# Patient Record
Sex: MALE | Race: OTHER | HISPANIC OR LATINO | URBAN - METROPOLITAN AREA
[De-identification: names, ages, dates, MRNs, and addresses within clinical notes are randomized per-mention and may not be internally consistent; named-entity substitution may affect disease eponyms.]

---

## 2018-08-06 ENCOUNTER — EMERGENCY (EMERGENCY)
Facility: HOSPITAL | Age: 38
LOS: 1 days | Discharge: HOME | End: 2018-08-06
Attending: EMERGENCY MEDICINE
Payer: COMMERCIAL

## 2018-08-06 VITALS
HEART RATE: 77 BPM | SYSTOLIC BLOOD PRESSURE: 145 MMHG | TEMPERATURE: 98 F | RESPIRATION RATE: 18 BRPM | OXYGEN SATURATION: 97 % | DIASTOLIC BLOOD PRESSURE: 97 MMHG

## 2018-08-06 PROCEDURE — 90715 TDAP VACCINE 7 YRS/> IM: CPT

## 2018-08-06 PROCEDURE — 99283 EMERGENCY DEPT VISIT LOW MDM: CPT | Mod: 25

## 2018-08-06 PROCEDURE — 90471 IMMUNIZATION ADMIN: CPT

## 2018-08-06 PROCEDURE — 73140 X-RAY EXAM OF FINGER(S): CPT | Mod: 26,LT

## 2018-08-06 PROCEDURE — 99285 EMERGENCY DEPT VISIT HI MDM: CPT | Mod: 25

## 2018-08-06 PROCEDURE — 26750 TREAT FINGER FRACTURE EACH: CPT | Mod: F1

## 2018-08-06 PROCEDURE — 14040 TIS TRNFR F/C/C/M/N/A/G/H/F: CPT

## 2018-08-06 PROCEDURE — 11760 REPAIR OF NAIL BED: CPT | Mod: F1

## 2018-08-06 PROCEDURE — 73140 X-RAY EXAM OF FINGER(S): CPT

## 2018-08-06 PROCEDURE — 26750 TREAT FINGER FRACTURE EACH: CPT | Mod: 54,F1

## 2018-08-06 RX ORDER — CEPHALEXIN 500 MG
500 CAPSULE ORAL ONCE
Qty: 0 | Refills: 0 | Status: COMPLETED | OUTPATIENT
Start: 2018-08-06 | End: 2018-08-06

## 2018-08-06 RX ORDER — OXYCODONE HYDROCHLORIDE 5 MG/1
1 TABLET ORAL
Qty: 6 | Refills: 0 | OUTPATIENT
Start: 2018-08-06 | End: 2018-08-07

## 2018-08-06 RX ORDER — TETANUS TOXOID, REDUCED DIPHTHERIA TOXOID AND ACELLULAR PERTUSSIS VACCINE, ADSORBED 5; 2.5; 8; 8; 2.5 [IU]/.5ML; [IU]/.5ML; UG/.5ML; UG/.5ML; UG/.5ML
0.5 SUSPENSION INTRAMUSCULAR ONCE
Qty: 0 | Refills: 0 | Status: COMPLETED | OUTPATIENT
Start: 2018-08-06 | End: 2018-08-06

## 2018-08-06 RX ADMIN — Medication 500 MILLIGRAM(S): at 15:26

## 2018-08-06 RX ADMIN — TETANUS TOXOID, REDUCED DIPHTHERIA TOXOID AND ACELLULAR PERTUSSIS VACCINE, ADSORBED 0.5 MILLILITER(S): 5; 2.5; 8; 8; 2.5 SUSPENSION INTRAMUSCULAR at 15:27

## 2018-08-06 NOTE — ED PROVIDER NOTE - CARE PLAN
Principal Discharge DX:	Open fracture of distal phalanx of left index finger  Assessment and plan of treatment:	1. Follow up with your PMD in 1-2 days.  2. Please follow up with Dr. Salvador Sorenson Hand surgeon [phone] on Thursday 8/9/18. Please call his office to schedule the appointment.  3. Take Augmentin as prescribed. Take Tylenol 650mg 1 tab every 4-6 hours as needed for pain. For any breakthru pain please take 1 tablet oxycodone 5mg every 6 hours as needed for severe pain. Do not drive or consume alcohol while taking this medication.  4. Keep splint clean, dry, and in place until your follow up with Dr. Sorenson.  5. Return to the ED immediately if you develop any new/worsening symptoms including worsening pain, fever/chills, numbness/tingling, nausea/vomiting, swelling, red streaks up the hand or arm, or any other concerning symptom.

## 2018-08-06 NOTE — ED PROVIDER NOTE - OBJECTIVE STATEMENT
39 yo male no PMHx presents to the ED 39 yo right hand dominant male w/ no PMHx presents to the ED c/o left index finger injury. Patient was at work ~1 hour PTA and was closing the metal back door of a truck which was caught on something. Patient re-adjusted door and then tried to slam it shut by his L index finger got caught in the door and he tried to pull his finger out. Patient's finger was crushed in door and he's now presenting with left index finger pain and laceration to his finger tip. Reports numbness in finger and hesitancy to move finger 2/2 pain. EMS gave him 8 mg IV morphine with relief en route. Denies fever/chills, headache, LOC, visual changes, left hand/wrist pain, other lacerations/injuries, pain in any other digit of left hand. n/v, dizziness, chest pain, shortness of breath. Cannot recall las tetanus. 39 yo right hand dominant male w/ no PMHx presents to the ED c/o left index finger injury. Patient was at work ~1 hour PTA and was closing the metal back door of a truck which was caught on something. Patient re-adjusted door and then tried to slam it shut by his L index finger got caught in the door and he tried to pull his finger out. Patient's finger was crushed in door and he's now presenting with left index finger pain and laceration to his finger tip. Reports numbness in finger and hesitancy to move finger 2/2 pain. EMS gave him 8 mg IV morphine with relief en route. Denies fever/chills, headache, LOC, visual changes, left hand/wrist pain, other lacerations/injuries, pain in any other digit of left hand. n/v, dizziness, chest pain, shortness of breath. Cannot recall last tetanus.

## 2018-08-06 NOTE — ED PROVIDER NOTE - ATTENDING CONTRIBUTION TO CARE
Attending MD Griffiths:   I personally have seen and examined this patient.  Physician assistant note reviewed and agree on plan of care and except where noted.  See HPI, PE, and MDM for details.

## 2018-08-06 NOTE — ED PROVIDER NOTE - PROGRESS NOTE DETAILS
Patient given 8mg morphine en route to ED. Offered additional analgesia to patient but he denied at this time. Advised patient to inform ED if requires additional analgesia. Will reassess. Hand consulted. - Vlad Bonilla PA-C Dr. Sorenson repaired wound at bedside. Recommending Augmentin x 7 days and f/u with him in office on Thursday. MD aware. Will plan for d/c. - MARLENI SalamancaC

## 2018-08-06 NOTE — ED PROVIDER NOTE - PHYSICAL EXAMINATION
Attending MD Griffiths: A & O x 3, NAD, affect appropriate. neuro exam non focal with no motor or sensory deficits. left 2nd digit: complex irregular laceration of distal digit extending from below nail fold to medial aspects of digit b/l, nailbed avulsed, flexion and extension intact, sensation intact distally

## 2018-08-06 NOTE — ED PROVIDER NOTE - PLAN OF CARE
1. Follow up with your PMD in 1-2 days.  2. Please follow up with Dr. Salvador Sorenson Hand surgeon [phone] on Thursday 8/9/18. Please call his office to schedule the appointment.  3. Take Augmentin as prescribed. Take Tylenol 650mg 1 tab every 4-6 hours as needed for pain. For any breakthru pain please take 1 tablet oxycodone 5mg every 6 hours as needed for severe pain. Do not drive or consume alcohol while taking this medication.  4. Keep splint clean, dry, and in place until your follow up with Dr. Sorenson.  5. Return to the ED immediately if you develop any new/worsening symptoms including worsening pain, fever/chills, numbness/tingling, nausea/vomiting, swelling, red streaks up the hand or arm, or any other concerning symptom.

## 2018-08-06 NOTE — ED ADULT NURSE NOTE - OBJECTIVE STATEMENT
37 y/o male presenting to ED for amputation of left index finger at first knuckle. Pt states "I tried to move the metal door of the back of the truck but my finger got caught." EMS gave 8 mg morphine. Upon exam, pt wound noted on left index finger, finger nail displaced, not actively bleeding. Pt denies sensation of finger, no ROM in finger.

## 2018-08-06 NOTE — ED PROVIDER NOTE - CHIEF COMPLAINT
The patient is a 38y Male complaining of The patient is a 38y Male complaining of L index finger injury

## 2018-08-06 NOTE — ED ADULT NURSE NOTE - NSIMPLEMENTINTERV_GEN_ALL_ED
Implemented All Universal Safety Interventions:  [place] to call system. Call bell, personal items and telephone within reach. Instruct patient to call for assistance. Room bathroom lighting operational. Non-slip footwear when patient is off stretcher. Physically safe environment: no spills, clutter or unnecessary equipment. Stretcher in lowest position, wheels locked, appropriate side rails in place.

## 2018-08-06 NOTE — ED PROVIDER NOTE - MEDICAL DECISION MAKING DETAILS
Attending MD Griffiths: 38M with complex 2nd digit laceration involving nailbed, possible open tuft fx. Plan for XR, tetanus, abx and hand surgery consult

## 2018-08-06 NOTE — ED PROVIDER NOTE - MUSCULOSKELETAL MINIMAL EXAM
LEFT HAND: +obvious laceration/deformity noted to proximal left second digit extending from dorsal DIP below nailfold with elevation/avulsion of nailbed. Decreased ROM of Left second digit at DIP 2/2 pain. Full ROM PIP and MCP of affected digit. Otherwise L hand without any obvious deformity/injuries and has full AROM all other digits with 5/5 strength. Distal neurovascular exam intact. 2+ radial pulses full and equal bilaterally./RANGE OF MOTION LIMITED/MOTOR DEFICITS

## 2019-03-23 NOTE — ED PROVIDER NOTE - SKIN WOUND TYPE
No
+complex laceration noted to dorsum distal 2nd digit of left hand extending under nail bed with elevation/avulsion of nailbed./LACERATION(S)

## 2022-02-22 NOTE — ED PROVIDER NOTE - DATE/TIME 2
06-Aug-2018 16:38 Dapsone Counseling: I discussed with the patient the risks of dapsone including but not limited to hemolytic anemia, agranulocytosis, rashes, methemoglobinemia, kidney failure, peripheral neuropathy, headaches, GI upset, and liver toxicity.  Patients who start dapsone require monitoring including baseline LFTs and weekly CBCs for the first month, then every month thereafter.  The patient verbalized understanding of the proper use and possible adverse effects of dapsone.  All of the patient's questions and concerns were addressed.

## 2024-01-04 ENCOUNTER — OFFICE (OUTPATIENT)
Dept: URBAN - METROPOLITAN AREA CLINIC 112 | Facility: CLINIC | Age: 44
Setting detail: OPHTHALMOLOGY
End: 2024-01-04
Payer: COMMERCIAL

## 2024-01-04 DIAGNOSIS — H43.392: ICD-10-CM

## 2024-01-04 DIAGNOSIS — H25.13: ICD-10-CM

## 2024-01-04 PROBLEM — H16.223 DRY EYE SYNDROME K SICCA; BOTH EYES: Status: ACTIVE | Noted: 2024-01-04

## 2024-01-04 PROBLEM — H11.151 PINGUECULA; RIGHT EYE: Status: ACTIVE | Noted: 2024-01-04

## 2024-01-04 PROCEDURE — 92250 FUNDUS PHOTOGRAPHY W/I&R: CPT | Performed by: OPHTHALMOLOGY

## 2024-01-04 PROCEDURE — 92004 COMPRE OPH EXAM NEW PT 1/>: CPT | Performed by: OPHTHALMOLOGY

## 2024-01-04 ASSESSMENT — REFRACTION_AUTOREFRACTION
OS_SPHERE: +1.00
OS_CYLINDER: -1.00
OD_SPHERE: +1.00
OD_AXIS: 080
OS_AXIS: 094
OD_CYLINDER: -0.75

## 2024-01-04 ASSESSMENT — CONFRONTATIONAL VISUAL FIELD TEST (CVF)
OS_FINDINGS: FULL
OD_FINDINGS: FULL

## 2024-01-04 ASSESSMENT — SPHEQUIV_DERIVED
OD_SPHEQUIV: 0.625
OS_SPHEQUIV: 0.5

## 2024-01-04 ASSESSMENT — SUPERFICIAL PUNCTATE KERATITIS (SPK)
OD_SPK: 1+
OS_SPK: 1+